# Patient Record
Sex: FEMALE | Race: WHITE | NOT HISPANIC OR LATINO | URBAN - METROPOLITAN AREA
[De-identification: names, ages, dates, MRNs, and addresses within clinical notes are randomized per-mention and may not be internally consistent; named-entity substitution may affect disease eponyms.]

---

## 2017-02-03 ENCOUNTER — EMERGENCY (EMERGENCY)
Facility: HOSPITAL | Age: 57
LOS: 1 days | Discharge: HOME | End: 2017-02-03
Attending: EMERGENCY MEDICINE | Admitting: EMERGENCY MEDICINE
Payer: COMMERCIAL

## 2017-02-03 VITALS
WEIGHT: 119.93 LBS | SYSTOLIC BLOOD PRESSURE: 122 MMHG | DIASTOLIC BLOOD PRESSURE: 83 MMHG | OXYGEN SATURATION: 99 % | TEMPERATURE: 98 F | HEART RATE: 70 BPM | RESPIRATION RATE: 18 BRPM
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
## 2017-02-03 DIAGNOSIS — S80.02XA CONTUSION OF LEFT KNEE, INITIAL ENCOUNTER: ICD-10-CM

## 2017-02-03 DIAGNOSIS — S09.90XA UNSPECIFIED INJURY OF HEAD, INITIAL ENCOUNTER: ICD-10-CM

## 2017-02-03 DIAGNOSIS — S01.111A LACERATION WITHOUT FOREIGN BODY OF RIGHT EYELID AND PERIOCULAR AREA, INITIAL ENCOUNTER: ICD-10-CM

## 2017-02-03 DIAGNOSIS — R07.81 PLEURODYNIA: ICD-10-CM

## 2017-02-03 DIAGNOSIS — Z91.040 LATEX ALLERGY STATUS: ICD-10-CM
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
## 2017-02-03 DIAGNOSIS — M54.2 CERVICALGIA: ICD-10-CM

## 2017-02-03 PROCEDURE — 99284 EMERGENCY DEPT VISIT MOD MDM: CPT | Mod: 25

## 2017-02-03 PROCEDURE — 71110 X-RAY EXAM RIBS BIL 3 VIEWS: CPT | Mod: 26

## 2017-02-03 PROCEDURE — 71020: CPT | Mod: 26

## 2017-02-03 PROCEDURE — 71100 X-RAY EXAM RIBS UNI 2 VIEWS: CPT | Mod: 26,RT

## 2017-02-03 PROCEDURE — 96372 THER/PROPH/DIAG INJ SC/IM: CPT

## 2017-02-03 PROCEDURE — 71100 X-RAY EXAM RIBS UNI 2 VIEWS: CPT

## 2017-02-03 PROCEDURE — 71046 X-RAY EXAM CHEST 2 VIEWS: CPT

## 2017-02-03 RX ORDER — KETOROLAC TROMETHAMINE 30 MG/ML
30 SYRINGE (ML) INJECTION ONCE
Qty: 0 | Refills: 0 | Status: DISCONTINUED | OUTPATIENT
Start: 2017-02-03 | End: 2017-02-03

## 2017-02-03 RX ORDER — ACETAMINOPHEN 500 MG
325 TABLET ORAL ONCE
Qty: 0 | Refills: 0 | Status: COMPLETED | OUTPATIENT
Start: 2017-02-03 | End: 2017-02-03

## 2017-02-03 RX ADMIN — Medication 30 MILLIGRAM(S): at 22:42

## 2017-02-03 RX ADMIN — Medication 325 MILLIGRAM(S): at 22:42

## 2017-02-03 NOTE — ED ADULT NURSE NOTE - OBJECTIVE STATEMENT
56 yr old female patient with c/o of mechanical fall, laceration noted to R side of forehead.  No distress noted.  Denies being on anticoagulants.  Denies dizziness, LOC.  A+OX3, ambulatory with steady gait.

## 2017-02-03 NOTE — ED PROVIDER NOTE - OBJECTIVE STATEMENT
55 yo female c/o mechanical fall w chi w/o loc, lac R eyebrow, pain R lateral neck, R lower ribs and R knee w/o sob.  Ha now resolved, no change in vision, speech, gait (ambulatory after accident), numbness/weakness in ext, abd pain, back pain.  Td 2013.  No relief w tylenol 325 mg.  No blood thinners.

## 2017-02-03 NOTE — ED PROVIDER NOTE - MEDICAL DECISION MAKING DETAILS
Pt s/p mechanical fall w chi (minor, no neuro deficits, no blood thinners, no imaging indicated), lac (Dr Barron in ed at time of my eval of pt for repair - to f/u w him in office for further care; wound care reviewed), elbow/knee pain (no sig findings on exam to suggest fx), and lower rib pain w lung cta - ? fx, no concern for ptx.  Rib/cxr neg on my read.  Pt dc w pain meds, f/u Dr Barron and pmd.

## 2017-02-03 NOTE — ED ADULT TRIAGE NOTE - CHIEF COMPLAINT QUOTE
s/p trip and fell sustained laceration to rt side of the forehead, pain to lt knee and lower back.Pt denies LOC, no dizziness

## 2017-02-03 NOTE — ED PROVIDER NOTE - RESPIRATORY, MLM
Breath sounds clear and equal bilaterally.  R anterior lower ribs ttp w/o crepitus or step off, no lateral or post rib ttp

## 2017-02-03 NOTE — ED PROVIDER NOTE - CONSTITUTIONAL, MLM
normal... Well appearing, well nourished, awake, alert, oriented to person, place, time/situation and in painful and anxious distress.

## 2017-02-03 NOTE — ED PROVIDER NOTE - ENMT, MLM
Airway patent, Nasal mucosa clear. Mouth with normal mucosa. Throat has no vesicles, no oropharyngeal exudates and uvula is midline. tm nl bilat

## 2017-02-03 NOTE — ED PROVIDER NOTE - DIAGNOSTIC INTERPRETATION
ER Physician:  Sherin Director  CHEST XRAY INTERPRETATION: lungs clear, heart shadow normal, bony structures intact    ER Physician: Sherin Director  INTERPRETATION: r ribs -   no acute fracture; no soft tissue swelling noted; normal bony alignment.

## 2017-02-03 NOTE — ED PROVIDER NOTE - NEUROLOGICAL, MLM
Awake, alert, oriented x 3, CN II-XII grossly intact, motor 5/5, no gross sens deficits, gait steady, no ataxia, speech clear.

## 2017-02-03 NOTE — ED PROVIDER NOTE - MUSCULOSKELETAL, MLM
Spine appears normal, no midline ttp, mild R paracervical ttp, R elbow and knee nontender w from, no bony deformity, knee w/o laxity or draw, + ecchymosis over patella, radial and dp 1+, range of motion is not limited, no muscle or joint tenderness

## 2017-02-04 RX ORDER — IBUPROFEN 200 MG
1 TABLET ORAL
Qty: 30 | Refills: 0 | OUTPATIENT
Start: 2017-02-04

## 2017-02-04 RX ORDER — DIAZEPAM 5 MG
1 TABLET ORAL
Qty: 10 | Refills: 0 | OUTPATIENT
Start: 2017-02-04

## 2018-02-28 ENCOUNTER — APPOINTMENT (OUTPATIENT)
Dept: OTOLARYNGOLOGY | Facility: CLINIC | Age: 58
End: 2018-02-28
Payer: COMMERCIAL

## 2018-02-28 ENCOUNTER — APPOINTMENT (OUTPATIENT)
Age: 58
End: 2018-02-28
Payer: COMMERCIAL

## 2018-02-28 VITALS
DIASTOLIC BLOOD PRESSURE: 64 MMHG | OXYGEN SATURATION: 97 % | TEMPERATURE: 98.2 F | SYSTOLIC BLOOD PRESSURE: 103 MMHG | HEART RATE: 61 BPM

## 2018-02-28 DIAGNOSIS — Z78.9 OTHER SPECIFIED HEALTH STATUS: ICD-10-CM

## 2018-02-28 DIAGNOSIS — H93.13 TINNITUS, BILATERAL: ICD-10-CM

## 2018-02-28 DIAGNOSIS — Z80.9 FAMILY HISTORY OF MALIGNANT NEOPLASM, UNSPECIFIED: ICD-10-CM

## 2018-02-28 DIAGNOSIS — H93.90 UNSPECIFIED DISORDER OF EAR, UNSPECIFIED EAR: ICD-10-CM

## 2018-02-28 DIAGNOSIS — Z85.9 PERSONAL HISTORY OF MALIGNANT NEOPLASM, UNSPECIFIED: ICD-10-CM

## 2018-02-28 DIAGNOSIS — Z82.2 FAMILY HISTORY OF DEAFNESS AND HEARING LOSS: ICD-10-CM

## 2018-02-28 DIAGNOSIS — Z87.39 PERSONAL HISTORY OF OTHER DISEASES OF THE MUSCULOSKELETAL SYSTEM AND CONNECTIVE TISSUE: ICD-10-CM

## 2018-02-28 PROBLEM — Z00.00 ENCOUNTER FOR PREVENTIVE HEALTH EXAMINATION: Status: ACTIVE | Noted: 2018-02-28

## 2018-02-28 PROCEDURE — 99204 OFFICE O/P NEW MOD 45 MIN: CPT

## 2018-02-28 PROCEDURE — 92557 COMPREHENSIVE HEARING TEST: CPT

## 2018-02-28 PROCEDURE — 92550 TYMPANOMETRY & REFLEX THRESH: CPT

## 2018-03-01 PROBLEM — H93.13 TINNITUS OF BOTH EARS: Status: ACTIVE | Noted: 2018-03-01

## 2018-03-01 PROBLEM — Z82.2 FAMILY HISTORY OF DEAFNESS OR HEARING LOSS: Status: ACTIVE | Noted: 2018-02-28

## 2018-03-01 PROBLEM — Z87.39 HISTORY OF NECK PAIN: Status: RESOLVED | Noted: 2018-02-28 | Resolved: 2018-03-01

## 2018-03-01 PROBLEM — Z78.9 NON-SMOKER: Status: ACTIVE | Noted: 2018-02-28

## 2018-03-01 PROBLEM — H93.90 EAR PROBLEM: Status: ACTIVE | Noted: 2018-02-28

## 2018-03-01 PROBLEM — Z80.9 FAMILY HISTORY OF MALIGNANT NEOPLASM: Status: ACTIVE | Noted: 2018-02-28

## 2018-03-01 PROBLEM — Z85.9 HISTORY OF MALIGNANT NEOPLASM: Status: RESOLVED | Noted: 2018-02-28 | Resolved: 2018-03-01

## 2018-03-01 LAB
ANION GAP SERPL CALC-SCNC: 15 MMOL/L
B BURGDOR AB SER-IMP: NEGATIVE
B BURGDOR IGG+IGM SER QL: 0.03 INDEX
BASOPHILS # BLD AUTO: 0.02 K/UL
BASOPHILS NFR BLD AUTO: 0.4 %
BUN SERPL-MCNC: 20 MG/DL
CALCIUM SERPL-MCNC: 9.4 MG/DL
CHLORIDE SERPL-SCNC: 101 MMOL/L
CO2 SERPL-SCNC: 26 MMOL/L
CREAT SERPL-MCNC: 0.86 MG/DL
DSDNA AB SER-ACNC: <12 IU/ML
EOSINOPHIL # BLD AUTO: 0.18 K/UL
EOSINOPHIL NFR BLD AUTO: 3.2 %
ERYTHROCYTE [SEDIMENTATION RATE] IN BLOOD BY WESTERGREN METHOD: 3 MM/HR
GLUCOSE SERPL-MCNC: 96 MG/DL
HCT VFR BLD CALC: 40.5 %
HGB BLD-MCNC: 13.3 G/DL
IMM GRANULOCYTES NFR BLD AUTO: 0 %
LYMPHOCYTES # BLD AUTO: 1.77 K/UL
LYMPHOCYTES NFR BLD AUTO: 31.2 %
MAN DIFF?: NORMAL
MCHC RBC-ENTMCNC: 30 PG
MCHC RBC-ENTMCNC: 32.8 GM/DL
MCV RBC AUTO: 91.2 FL
MONOCYTES # BLD AUTO: 0.43 K/UL
MONOCYTES NFR BLD AUTO: 7.6 %
NEUTROPHILS # BLD AUTO: 3.28 K/UL
NEUTROPHILS NFR BLD AUTO: 57.6 %
PLATELET # BLD AUTO: 189 K/UL
POTASSIUM SERPL-SCNC: 4.2 MMOL/L
RBC # BLD: 4.44 M/UL
RBC # FLD: 13.4 %
SODIUM SERPL-SCNC: 142 MMOL/L
T3 SERPL-MCNC: 100 NG/DL
T4 SERPL-MCNC: 6.3 UG/DL
TSH SERPL-ACNC: 1.95 UIU/ML
WBC # FLD AUTO: 5.68 K/UL

## 2018-03-01 RX ORDER — CONJUGATED ESTROGENS 0.62 MG/G
0.62 CREAM VAGINAL
Qty: 30 | Refills: 0 | Status: ACTIVE | COMMUNITY
Start: 2017-11-08

## 2018-03-02 LAB
ANA SER IF-ACNC: NEGATIVE
T PALLIDUM AB SER DONR QL IF: NONREACTIVE

## 2018-03-09 ENCOUNTER — APPOINTMENT (OUTPATIENT)
Dept: OTOLARYNGOLOGY | Facility: CLINIC | Age: 58
End: 2018-03-09

## 2018-03-11 LAB — INNER EAR 68KD AB FLD QL: NEGATIVE

## 2018-07-23 PROBLEM — Z78.9 ALCOHOL USE: Status: ACTIVE | Noted: 2018-02-28

## 2021-02-05 ENCOUNTER — NON-APPOINTMENT (OUTPATIENT)
Age: 61
End: 2021-02-05

## 2021-05-26 ENCOUNTER — APPOINTMENT (OUTPATIENT)
Dept: OTOLARYNGOLOGY | Facility: CLINIC | Age: 61
End: 2021-05-26
Payer: COMMERCIAL

## 2021-05-26 VITALS
BODY MASS INDEX: 18.96 KG/M2 | HEIGHT: 66 IN | OXYGEN SATURATION: 96 % | DIASTOLIC BLOOD PRESSURE: 61 MMHG | SYSTOLIC BLOOD PRESSURE: 98 MMHG | HEART RATE: 58 BPM | TEMPERATURE: 98.2 F | WEIGHT: 118 LBS

## 2021-05-26 DIAGNOSIS — H92.01 OTALGIA, RIGHT EAR: ICD-10-CM

## 2021-05-26 DIAGNOSIS — H93.8X1 OTHER SPECIFIED DISORDERS OF RIGHT EAR: ICD-10-CM

## 2021-05-26 DIAGNOSIS — H90.3 SENSORINEURAL HEARING LOSS, BILATERAL: ICD-10-CM

## 2021-05-26 PROCEDURE — 92557 COMPREHENSIVE HEARING TEST: CPT

## 2021-05-26 PROCEDURE — 92550 TYMPANOMETRY & REFLEX THRESH: CPT

## 2021-05-26 PROCEDURE — 99072 ADDL SUPL MATRL&STAF TM PHE: CPT

## 2021-05-26 PROCEDURE — 99203 OFFICE O/P NEW LOW 30 MIN: CPT

## 2021-05-26 RX ORDER — MULTIVITAMIN
TABLET ORAL
Refills: 0 | Status: ACTIVE | COMMUNITY

## 2021-05-26 NOTE — HISTORY OF PRESENT ILLNESS
[de-identified] : 62 yo woman with h/o hf snhl feels r ear is plugged for a few d perhaps by wax. Currently in b ears. Denies uri or other inciting event. She has mild high pitched tinnitus.no vertigo.

## 2021-05-26 NOTE — ASSESSMENT
[FreeTextEntry1] : hf snhl no significant change in plast 3 yrs and tinnitus, tmj\par soft diet nsaids see dentist - contact info given\par reviewed  together she prefers inc bckgd noise for tinitus - does not want ha or trt\par rtc 1 yr and as needed

## 2022-10-04 NOTE — ED PROVIDER NOTE - CARE PLAN
Principal Discharge DX:	Closed head injury, initial encounter  Secondary Diagnosis:	Facial laceration, initial encounter  Secondary Diagnosis:	Contusion of knee Indwelling

## 2025-05-01 NOTE — ED PROVIDER NOTE - NS ED MD EM SELECTION
Outpatient Clinic Note  Orthopedic Surgery  Hip and Knee Replacement  Hip Arthroscopy           Diagnosis:   left Distal humerus fracture      Procedure and DOS:  Open reduction internal fixation of left Distal humerus fracture on 4/20/2025     Time interval:  3 weeks           Ambulation aids: ***     DVT prophylaxis: ***     Opioid medication use: ***     Physical therapy: ***        Prior recommendations:   ***      Specific concerns: ***           OBJECTIVE:  General: Patient in no distress.  Alert and oriented in time, place and person.  Skin:  ***  Musculoskeletal:  Compartments are soft and depressible with minimal swelling. ***  Neurologic:  ***        Radiographs:  *** x-rays done today at [facility] and interpreted by myself. ***        ASSESSMENT:    ***     Plan:   DVT prophylaxis: ***  Pain medication: ***  Activity and restrictions: ***  Follow-up: ***   [address] Exp Problem Focused - Mod. Complex

## 2025-07-23 NOTE — ED ADULT NURSE NOTE - BREATH SOUNDS, MLM
[Normal Rate and Rhythm] : normal rate and rhythm [2+] : left 2+ [Alert] : alert [Oriented to Person] : oriented to person [Oriented to Place] : oriented to place [Oriented to Time] : oriented to time [Calm] : calm [Abdomen Tenderness] : ~T ~M No abdominal tenderness [Skin Ulcer] : no ulcer [de-identified] : Appears well, in no acute distress.  [de-identified] : normocephalic, atraumatic  [de-identified] :   normal respiratory effort. unlabored breathing.  [de-identified] : healed LLE medal/lateral calf incisions Clear